# Patient Record
Sex: MALE | Race: WHITE | NOT HISPANIC OR LATINO
[De-identification: names, ages, dates, MRNs, and addresses within clinical notes are randomized per-mention and may not be internally consistent; named-entity substitution may affect disease eponyms.]

---

## 2022-04-08 ENCOUNTER — TRANSCRIPTION ENCOUNTER (OUTPATIENT)
Age: 37
End: 2022-04-08

## 2023-03-03 VITALS
OXYGEN SATURATION: 98 % | TEMPERATURE: 98 F | DIASTOLIC BLOOD PRESSURE: 103 MMHG | HEART RATE: 61 BPM | RESPIRATION RATE: 16 BRPM | SYSTOLIC BLOOD PRESSURE: 164 MMHG

## 2023-03-03 LAB
ALBUMIN SERPL ELPH-MCNC: 4 G/DL — SIGNIFICANT CHANGE UP (ref 3.4–5)
ALP SERPL-CCNC: 47 U/L — SIGNIFICANT CHANGE UP (ref 40–120)
ALT FLD-CCNC: 156 U/L — HIGH (ref 12–42)
ANION GAP SERPL CALC-SCNC: 10 MMOL/L — SIGNIFICANT CHANGE UP (ref 9–16)
AST SERPL-CCNC: 88 U/L — HIGH (ref 15–37)
BASOPHILS # BLD AUTO: 0.03 K/UL — SIGNIFICANT CHANGE UP (ref 0–0.2)
BASOPHILS NFR BLD AUTO: 0.3 % — SIGNIFICANT CHANGE UP (ref 0–2)
BILIRUB DIRECT SERPL-MCNC: 0.2 MG/DL — SIGNIFICANT CHANGE UP (ref 0–0.3)
BILIRUB INDIRECT FLD-MCNC: 0.6 MG/DL — SIGNIFICANT CHANGE UP (ref 0.2–1)
BILIRUB SERPL-MCNC: 0.8 MG/DL — SIGNIFICANT CHANGE UP (ref 0.2–1.2)
BUN SERPL-MCNC: 7 MG/DL — SIGNIFICANT CHANGE UP (ref 7–23)
CALCIUM SERPL-MCNC: 11.3 MG/DL — HIGH (ref 8.5–10.5)
CHLORIDE SERPL-SCNC: 99 MMOL/L — SIGNIFICANT CHANGE UP (ref 96–108)
CO2 SERPL-SCNC: 28 MMOL/L — SIGNIFICANT CHANGE UP (ref 22–31)
CREAT SERPL-MCNC: 0.92 MG/DL — SIGNIFICANT CHANGE UP (ref 0.5–1.3)
EGFR: 110 ML/MIN/1.73M2 — SIGNIFICANT CHANGE UP
EOSINOPHIL # BLD AUTO: 0 K/UL — SIGNIFICANT CHANGE UP (ref 0–0.5)
EOSINOPHIL NFR BLD AUTO: 0 % — SIGNIFICANT CHANGE UP (ref 0–6)
GLUCOSE SERPL-MCNC: 161 MG/DL — HIGH (ref 70–99)
HCT VFR BLD CALC: 51.5 % — HIGH (ref 39–50)
HGB BLD-MCNC: 17.5 G/DL — HIGH (ref 13–17)
IMM GRANULOCYTES NFR BLD AUTO: 0.4 % — SIGNIFICANT CHANGE UP (ref 0–0.9)
LACTATE SERPL-SCNC: 2.1 MMOL/L — HIGH (ref 0.4–2)
LIDOCAIN IGE QN: >1500 U/L — HIGH (ref 73–393)
LYMPHOCYTES # BLD AUTO: 0.88 K/UL — LOW (ref 1–3.3)
LYMPHOCYTES # BLD AUTO: 7.9 % — LOW (ref 13–44)
MCHC RBC-ENTMCNC: 31 PG — SIGNIFICANT CHANGE UP (ref 27–34)
MCHC RBC-ENTMCNC: 34 GM/DL — SIGNIFICANT CHANGE UP (ref 32–36)
MCV RBC AUTO: 91.2 FL — SIGNIFICANT CHANGE UP (ref 80–100)
MONOCYTES # BLD AUTO: 0.86 K/UL — SIGNIFICANT CHANGE UP (ref 0–0.9)
MONOCYTES NFR BLD AUTO: 7.7 % — SIGNIFICANT CHANGE UP (ref 2–14)
NEUTROPHILS # BLD AUTO: 9.33 K/UL — HIGH (ref 1.8–7.4)
NEUTROPHILS NFR BLD AUTO: 83.7 % — HIGH (ref 43–77)
NRBC # BLD: 0 /100 WBCS — SIGNIFICANT CHANGE UP (ref 0–0)
PLATELET # BLD AUTO: 277 K/UL — SIGNIFICANT CHANGE UP (ref 150–400)
POTASSIUM SERPL-MCNC: 4 MMOL/L — SIGNIFICANT CHANGE UP (ref 3.5–5.3)
POTASSIUM SERPL-SCNC: 4 MMOL/L — SIGNIFICANT CHANGE UP (ref 3.5–5.3)
PROT SERPL-MCNC: 7.8 G/DL — SIGNIFICANT CHANGE UP (ref 6.4–8.2)
RBC # BLD: 5.65 M/UL — SIGNIFICANT CHANGE UP (ref 4.2–5.8)
RBC # FLD: 14.3 % — SIGNIFICANT CHANGE UP (ref 10.3–14.5)
SARS-COV-2 RNA SPEC QL NAA+PROBE: SIGNIFICANT CHANGE UP
SODIUM SERPL-SCNC: 137 MMOL/L — SIGNIFICANT CHANGE UP (ref 132–145)
WBC # BLD: 11.15 K/UL — HIGH (ref 3.8–10.5)
WBC # FLD AUTO: 11.15 K/UL — HIGH (ref 3.8–10.5)

## 2023-03-03 PROCEDURE — 99285 EMERGENCY DEPT VISIT HI MDM: CPT

## 2023-03-03 PROCEDURE — 74177 CT ABD & PELVIS W/CONTRAST: CPT | Mod: 26

## 2023-03-03 RX ORDER — SODIUM CHLORIDE 9 MG/ML
1000 INJECTION INTRAMUSCULAR; INTRAVENOUS; SUBCUTANEOUS ONCE
Refills: 0 | Status: COMPLETED | OUTPATIENT
Start: 2023-03-03 | End: 2023-03-03

## 2023-03-03 RX ORDER — KETOROLAC TROMETHAMINE 30 MG/ML
15 SYRINGE (ML) INJECTION ONCE
Refills: 0 | Status: DISCONTINUED | OUTPATIENT
Start: 2023-03-03 | End: 2023-03-03

## 2023-03-03 RX ORDER — FAMOTIDINE 10 MG/ML
20 INJECTION INTRAVENOUS ONCE
Refills: 0 | Status: COMPLETED | OUTPATIENT
Start: 2023-03-03 | End: 2023-03-03

## 2023-03-03 RX ORDER — MORPHINE SULFATE 50 MG/1
4 CAPSULE, EXTENDED RELEASE ORAL ONCE
Refills: 0 | Status: DISCONTINUED | OUTPATIENT
Start: 2023-03-03 | End: 2023-03-03

## 2023-03-03 RX ADMIN — MORPHINE SULFATE 4 MILLIGRAM(S): 50 CAPSULE, EXTENDED RELEASE ORAL at 22:34

## 2023-03-03 RX ADMIN — SODIUM CHLORIDE 1000 MILLILITER(S): 9 INJECTION INTRAMUSCULAR; INTRAVENOUS; SUBCUTANEOUS at 22:23

## 2023-03-03 RX ADMIN — FAMOTIDINE 20 MILLIGRAM(S): 10 INJECTION INTRAVENOUS at 22:21

## 2023-03-03 NOTE — ED PROVIDER NOTE - CLINICAL SUMMARY MEDICAL DECISION MAKING FREE TEXT BOX
A/P: 37-year old M presenting to the E.R. with abdominal pain, epigastric  --Differential includes but not limited to pancreatitis, PUD, gastritis, biliary pathology, appendicitis  --Plan to treat with pepcid IV, IVF  --Will check labs as well as CT A/P A/P: 37-year old M presenting to the E.R. with abdominal pain, epigastric  --Differential includes but not limited to pancreatitis, PUD, gastritis, biliary pathology, appendicitis  --Plan to treat with pepcid IV, IVF  --Will check labs as well as CT A/P    -----  100  --Lipase resulted as 5,895.  CT shows no evidence of pseudocyst or abscess, confirms edematous pancreas.  Patient somewhat relieved with Toradol, but still with some pain.  Vitals stable.  Patient will require admission for IVF, bowel rest, GI consultation as warranted by inpatient team.      Discussed case with  __ A/P: 37-year old M presenting to the E.R. with abdominal pain, epigastric  --Differential includes but not limited to pancreatitis, PUD, gastritis, biliary pathology, appendicitis  --Plan to treat with pepcid IV, IVF  --Will check labs as well as CT A/P    -----  100  --Lipase resulted as 5,895.  CT shows no evidence of pseudocyst or abscess, confirms edematous pancreas.  Patient somewhat relieved with Toradol, but still with some pain.  Vitals stable.  Patient will require admission for IVF, bowel rest, GI consultation as warranted by inpatient team.      Discussed case with Dr. Fracisco Casas who accepts patient for admission.

## 2023-03-03 NOTE — ED PROVIDER NOTE - PHYSICAL EXAMINATION
Constitutional:  Uncomfortable, sweating  HEENT: Airway patent, Nasal mucosa clear. Mouth with normal mucosa.   Eyes: Clear bilaterally, pupils equal, round and reactive to light.  Cardiac: Normal rate, regular rhythm.  Respiratory: Breath sounds clear and equal bilaterally.  GI: Abdomen soft, tender in epigastrium and RUQ  MSK: Spine appears normal, range of motion is not limited, no muscle or joint tenderness  Neuro: Alert and oriented, no focal deficits, no motor or sensory deficits.  Skin: Sweating

## 2023-03-03 NOTE — ED PROVIDER NOTE - OBJECTIVE STATEMENT
37-year-old male with no reported past medical history presenting to the emergency room with abdominal pain.  Patient states that it started at 11 in the morning, states it has become increasingly more severe, states that it started in his epigastrium and started radiating throughout his entire abdomen.  States it is associated with 5 episodes of vomiting.  States he had a normal bowel movement this morning.  States that he feels very hot and very cold, states that he is profusely sweating.  Admits that he drinks alcohol heavily 5 days a week approximately 4-5 drinks.  Denies any abdominal surgeries, states that he had a surgery on his hip years ago.

## 2023-03-03 NOTE — ED ADULT TRIAGE NOTE - CHIEF COMPLAINT QUOTE
Pt walk in c/o severe mid abdominal pain since 11am today. Pt also endorses nausea and vomiting w/ increased blenching and constipation. Pt denies PMH. Pt walk in c/o severe mid abdominal pain since 11am today. Pt also endorses nausea and vomiting w/ increased belching. States last bowel movement was 9am this morning. Pt denies PMH.

## 2023-03-04 ENCOUNTER — INPATIENT (INPATIENT)
Facility: HOSPITAL | Age: 38
LOS: 1 days | Discharge: HOME | DRG: 439 | End: 2023-03-06
Attending: STUDENT IN AN ORGANIZED HEALTH CARE EDUCATION/TRAINING PROGRAM | Admitting: STUDENT IN AN ORGANIZED HEALTH CARE EDUCATION/TRAINING PROGRAM
Payer: COMMERCIAL

## 2023-03-04 DIAGNOSIS — Z98.890 OTHER SPECIFIED POSTPROCEDURAL STATES: Chronic | ICD-10-CM

## 2023-03-04 DIAGNOSIS — K85.90 ACUTE PANCREATITIS WITHOUT NECROSIS OR INFECTION, UNSPECIFIED: ICD-10-CM

## 2023-03-04 DIAGNOSIS — R74.01 ELEVATION OF LEVELS OF LIVER TRANSAMINASE LEVELS: ICD-10-CM

## 2023-03-04 DIAGNOSIS — F10.20 ALCOHOL DEPENDENCE, UNCOMPLICATED: ICD-10-CM

## 2023-03-04 DIAGNOSIS — Z29.9 ENCOUNTER FOR PROPHYLACTIC MEASURES, UNSPECIFIED: ICD-10-CM

## 2023-03-04 LAB
ALBUMIN SERPL ELPH-MCNC: 3.1 G/DL — LOW (ref 3.3–5)
ALP SERPL-CCNC: 34 U/L — LOW (ref 40–120)
ALT FLD-CCNC: 71 U/L — HIGH (ref 10–45)
AMPHET UR-MCNC: NEGATIVE — SIGNIFICANT CHANGE UP
ANION GAP SERPL CALC-SCNC: 10 MMOL/L — SIGNIFICANT CHANGE UP (ref 5–17)
ANION GAP SERPL CALC-SCNC: 12 MMOL/L — SIGNIFICANT CHANGE UP (ref 5–17)
AST SERPL-CCNC: 48 U/L — HIGH (ref 10–40)
BARBITURATES UR SCN-MCNC: NEGATIVE — SIGNIFICANT CHANGE UP
BASOPHILS # BLD AUTO: 0.02 K/UL — SIGNIFICANT CHANGE UP (ref 0–0.2)
BASOPHILS NFR BLD AUTO: 0.2 % — SIGNIFICANT CHANGE UP (ref 0–2)
BENZODIAZ UR-MCNC: NEGATIVE — SIGNIFICANT CHANGE UP
BILIRUB SERPL-MCNC: 0.7 MG/DL — SIGNIFICANT CHANGE UP (ref 0.2–1.2)
BUN SERPL-MCNC: 10 MG/DL — SIGNIFICANT CHANGE UP (ref 7–23)
BUN SERPL-MCNC: 13 MG/DL — SIGNIFICANT CHANGE UP (ref 7–23)
CALCIUM SERPL-MCNC: 8.9 MG/DL — SIGNIFICANT CHANGE UP (ref 8.4–10.5)
CALCIUM SERPL-MCNC: 9.2 MG/DL — SIGNIFICANT CHANGE UP (ref 8.4–10.5)
CHLORIDE SERPL-SCNC: 100 MMOL/L — SIGNIFICANT CHANGE UP (ref 96–108)
CHLORIDE SERPL-SCNC: 98 MMOL/L — SIGNIFICANT CHANGE UP (ref 96–108)
CO2 SERPL-SCNC: 21 MMOL/L — LOW (ref 22–31)
CO2 SERPL-SCNC: 28 MMOL/L — SIGNIFICANT CHANGE UP (ref 22–31)
COCAINE METAB.OTHER UR-MCNC: NEGATIVE — SIGNIFICANT CHANGE UP
CREAT ?TM UR-MCNC: 529 MG/DL — SIGNIFICANT CHANGE UP
CREAT SERPL-MCNC: 0.72 MG/DL — SIGNIFICANT CHANGE UP (ref 0.5–1.3)
CREAT SERPL-MCNC: 0.92 MG/DL — SIGNIFICANT CHANGE UP (ref 0.5–1.3)
EGFR: 110 ML/MIN/1.73M2 — SIGNIFICANT CHANGE UP
EGFR: 121 ML/MIN/1.73M2 — SIGNIFICANT CHANGE UP
EOSINOPHIL # BLD AUTO: 0 K/UL — SIGNIFICANT CHANGE UP (ref 0–0.5)
EOSINOPHIL NFR BLD AUTO: 0 % — SIGNIFICANT CHANGE UP (ref 0–6)
GLUCOSE SERPL-MCNC: 89 MG/DL — SIGNIFICANT CHANGE UP (ref 70–99)
GLUCOSE SERPL-MCNC: 98 MG/DL — SIGNIFICANT CHANGE UP (ref 70–99)
HAV IGM SER-ACNC: SIGNIFICANT CHANGE UP
HBV CORE IGM SER-ACNC: SIGNIFICANT CHANGE UP
HCT VFR BLD CALC: 43.5 % — SIGNIFICANT CHANGE UP (ref 39–50)
HCT VFR BLD CALC: 47 % — SIGNIFICANT CHANGE UP (ref 39–50)
HCV AB S/CO SERPL IA: 0.04 S/CO — SIGNIFICANT CHANGE UP
HCV AB SERPL-IMP: SIGNIFICANT CHANGE UP
HGB BLD-MCNC: 14 G/DL — SIGNIFICANT CHANGE UP (ref 13–17)
HGB BLD-MCNC: 15.4 G/DL — SIGNIFICANT CHANGE UP (ref 13–17)
IMM GRANULOCYTES NFR BLD AUTO: 0.4 % — SIGNIFICANT CHANGE UP (ref 0–0.9)
LACTATE SERPL-SCNC: 1.1 MMOL/L — SIGNIFICANT CHANGE UP (ref 0.4–2)
LYMPHOCYTES # BLD AUTO: 1.1 K/UL — SIGNIFICANT CHANGE UP (ref 1–3.3)
LYMPHOCYTES # BLD AUTO: 8.6 % — LOW (ref 13–44)
MAGNESIUM SERPL-MCNC: 1 MG/DL — CRITICAL LOW (ref 1.6–2.6)
MAGNESIUM SERPL-MCNC: 2 MG/DL — SIGNIFICANT CHANGE UP (ref 1.6–2.6)
MCHC RBC-ENTMCNC: 30.4 PG — SIGNIFICANT CHANGE UP (ref 27–34)
MCHC RBC-ENTMCNC: 30.7 PG — SIGNIFICANT CHANGE UP (ref 27–34)
MCHC RBC-ENTMCNC: 32.2 GM/DL — SIGNIFICANT CHANGE UP (ref 32–36)
MCHC RBC-ENTMCNC: 32.8 GM/DL — SIGNIFICANT CHANGE UP (ref 32–36)
MCV RBC AUTO: 92.9 FL — SIGNIFICANT CHANGE UP (ref 80–100)
MCV RBC AUTO: 95.4 FL — SIGNIFICANT CHANGE UP (ref 80–100)
METHADONE UR-MCNC: NEGATIVE — SIGNIFICANT CHANGE UP
MONOCYTES # BLD AUTO: 0.98 K/UL — HIGH (ref 0–0.9)
MONOCYTES NFR BLD AUTO: 7.6 % — SIGNIFICANT CHANGE UP (ref 2–14)
NEUTROPHILS # BLD AUTO: 10.69 K/UL — HIGH (ref 1.8–7.4)
NEUTROPHILS NFR BLD AUTO: 83.2 % — HIGH (ref 43–77)
NRBC # BLD: 0 /100 WBCS — SIGNIFICANT CHANGE UP (ref 0–0)
NRBC # BLD: 0 /100 WBCS — SIGNIFICANT CHANGE UP (ref 0–0)
OPIATES UR-MCNC: POSITIVE
OSMOLALITY UR: 928 MOSM/KG — HIGH (ref 300–900)
PCP SPEC-MCNC: SIGNIFICANT CHANGE UP
PCP UR-MCNC: NEGATIVE — SIGNIFICANT CHANGE UP
PHOSPHATE SERPL-MCNC: 2.8 MG/DL — SIGNIFICANT CHANGE UP (ref 2.5–4.5)
PLATELET # BLD AUTO: 205 K/UL — SIGNIFICANT CHANGE UP (ref 150–400)
PLATELET # BLD AUTO: 212 K/UL — SIGNIFICANT CHANGE UP (ref 150–400)
POTASSIUM SERPL-MCNC: 4.2 MMOL/L — SIGNIFICANT CHANGE UP (ref 3.5–5.3)
POTASSIUM SERPL-MCNC: 4.3 MMOL/L — SIGNIFICANT CHANGE UP (ref 3.5–5.3)
POTASSIUM SERPL-SCNC: 4.2 MMOL/L — SIGNIFICANT CHANGE UP (ref 3.5–5.3)
POTASSIUM SERPL-SCNC: 4.3 MMOL/L — SIGNIFICANT CHANGE UP (ref 3.5–5.3)
PROT SERPL-MCNC: 5.3 G/DL — LOW (ref 6–8.3)
RBC # BLD: 4.56 M/UL — SIGNIFICANT CHANGE UP (ref 4.2–5.8)
RBC # BLD: 5.06 M/UL — SIGNIFICANT CHANGE UP (ref 4.2–5.8)
RBC # FLD: 14.6 % — HIGH (ref 10.3–14.5)
RBC # FLD: 15 % — HIGH (ref 10.3–14.5)
SODIUM SERPL-SCNC: 133 MMOL/L — LOW (ref 135–145)
SODIUM SERPL-SCNC: 136 MMOL/L — SIGNIFICANT CHANGE UP (ref 135–145)
SODIUM UR-SCNC: <20 MMOL/L — SIGNIFICANT CHANGE UP
THC UR QL: NEGATIVE — SIGNIFICANT CHANGE UP
TRIGL SERPL-MCNC: 83 MG/DL — SIGNIFICANT CHANGE UP
WBC # BLD: 12.84 K/UL — HIGH (ref 3.8–10.5)
WBC # BLD: 14.74 K/UL — HIGH (ref 3.8–10.5)
WBC # FLD AUTO: 12.84 K/UL — HIGH (ref 3.8–10.5)
WBC # FLD AUTO: 14.74 K/UL — HIGH (ref 3.8–10.5)

## 2023-03-04 PROCEDURE — 99223 1ST HOSP IP/OBS HIGH 75: CPT

## 2023-03-04 RX ORDER — LANOLIN ALCOHOL/MO/W.PET/CERES
3 CREAM (GRAM) TOPICAL AT BEDTIME
Refills: 0 | Status: DISCONTINUED | OUTPATIENT
Start: 2023-03-04 | End: 2023-03-05

## 2023-03-04 RX ORDER — SODIUM CHLORIDE 9 MG/ML
1000 INJECTION, SOLUTION INTRAVENOUS ONCE
Refills: 0 | Status: COMPLETED | OUTPATIENT
Start: 2023-03-04 | End: 2023-03-04

## 2023-03-04 RX ORDER — MORPHINE SULFATE 50 MG/1
4 CAPSULE, EXTENDED RELEASE ORAL EVERY 6 HOURS
Refills: 0 | Status: DISCONTINUED | OUTPATIENT
Start: 2023-03-04 | End: 2023-03-05

## 2023-03-04 RX ORDER — ONDANSETRON 8 MG/1
4 TABLET, FILM COATED ORAL EVERY 6 HOURS
Refills: 0 | Status: DISCONTINUED | OUTPATIENT
Start: 2023-03-04 | End: 2023-03-06

## 2023-03-04 RX ORDER — ONDANSETRON 8 MG/1
4 TABLET, FILM COATED ORAL ONCE
Refills: 0 | Status: COMPLETED | OUTPATIENT
Start: 2023-03-04 | End: 2023-03-04

## 2023-03-04 RX ORDER — ACETAMINOPHEN 500 MG
650 TABLET ORAL EVERY 6 HOURS
Refills: 0 | Status: DISCONTINUED | OUTPATIENT
Start: 2023-03-04 | End: 2023-03-06

## 2023-03-04 RX ORDER — THIAMINE MONONITRATE (VIT B1) 100 MG
100 TABLET ORAL DAILY
Refills: 0 | Status: DISCONTINUED | OUTPATIENT
Start: 2023-03-04 | End: 2023-03-06

## 2023-03-04 RX ORDER — SODIUM CHLORIDE 9 MG/ML
1000 INJECTION INTRAMUSCULAR; INTRAVENOUS; SUBCUTANEOUS
Refills: 0 | Status: DISCONTINUED | OUTPATIENT
Start: 2023-03-04 | End: 2023-03-04

## 2023-03-04 RX ORDER — OXYCODONE HYDROCHLORIDE 5 MG/1
5 TABLET ORAL EVERY 6 HOURS
Refills: 0 | Status: DISCONTINUED | OUTPATIENT
Start: 2023-03-04 | End: 2023-03-04

## 2023-03-04 RX ORDER — KETOROLAC TROMETHAMINE 30 MG/ML
15 SYRINGE (ML) INJECTION EVERY 6 HOURS
Refills: 0 | Status: DISCONTINUED | OUTPATIENT
Start: 2023-03-04 | End: 2023-03-06

## 2023-03-04 RX ORDER — MAGNESIUM SULFATE 500 MG/ML
2 VIAL (ML) INJECTION ONCE
Refills: 0 | Status: COMPLETED | OUTPATIENT
Start: 2023-03-04 | End: 2023-03-04

## 2023-03-04 RX ORDER — MORPHINE SULFATE 50 MG/1
2 CAPSULE, EXTENDED RELEASE ORAL EVERY 6 HOURS
Refills: 0 | Status: DISCONTINUED | OUTPATIENT
Start: 2023-03-04 | End: 2023-03-05

## 2023-03-04 RX ORDER — MORPHINE SULFATE 50 MG/1
4 CAPSULE, EXTENDED RELEASE ORAL ONCE
Refills: 0 | Status: DISCONTINUED | OUTPATIENT
Start: 2023-03-04 | End: 2023-03-04

## 2023-03-04 RX ORDER — KETOROLAC TROMETHAMINE 30 MG/ML
15 SYRINGE (ML) INJECTION ONCE
Refills: 0 | Status: DISCONTINUED | OUTPATIENT
Start: 2023-03-04 | End: 2023-03-04

## 2023-03-04 RX ORDER — SODIUM CHLORIDE 9 MG/ML
1000 INJECTION, SOLUTION INTRAVENOUS
Refills: 0 | Status: DISCONTINUED | OUTPATIENT
Start: 2023-03-04 | End: 2023-03-05

## 2023-03-04 RX ORDER — ONDANSETRON 8 MG/1
4 TABLET, FILM COATED ORAL EVERY 6 HOURS
Refills: 0 | Status: DISCONTINUED | OUTPATIENT
Start: 2023-03-04 | End: 2023-03-04

## 2023-03-04 RX ORDER — FOLIC ACID 0.8 MG
1 TABLET ORAL DAILY
Refills: 0 | Status: DISCONTINUED | OUTPATIENT
Start: 2023-03-04 | End: 2023-03-06

## 2023-03-04 RX ORDER — ONDANSETRON 8 MG/1
4 TABLET, FILM COATED ORAL ONCE
Refills: 0 | Status: DISCONTINUED | OUTPATIENT
Start: 2023-03-04 | End: 2023-03-04

## 2023-03-04 RX ORDER — INFLUENZA VIRUS VACCINE 15; 15; 15; 15 UG/.5ML; UG/.5ML; UG/.5ML; UG/.5ML
0.5 SUSPENSION INTRAMUSCULAR ONCE
Refills: 0 | Status: DISCONTINUED | OUTPATIENT
Start: 2023-03-04 | End: 2023-03-06

## 2023-03-04 RX ORDER — MAGNESIUM SULFATE 500 MG/ML
4 VIAL (ML) INJECTION ONCE
Refills: 0 | Status: COMPLETED | OUTPATIENT
Start: 2023-03-04 | End: 2023-03-04

## 2023-03-04 RX ORDER — OXYCODONE HYDROCHLORIDE 5 MG/1
10 TABLET ORAL EVERY 6 HOURS
Refills: 0 | Status: DISCONTINUED | OUTPATIENT
Start: 2023-03-04 | End: 2023-03-04

## 2023-03-04 RX ADMIN — MORPHINE SULFATE 4 MILLIGRAM(S): 50 CAPSULE, EXTENDED RELEASE ORAL at 06:39

## 2023-03-04 RX ADMIN — Medication 15 MILLIGRAM(S): at 19:39

## 2023-03-04 RX ADMIN — SODIUM CHLORIDE 200 MILLILITER(S): 9 INJECTION, SOLUTION INTRAVENOUS at 18:30

## 2023-03-04 RX ADMIN — ONDANSETRON 4 MILLIGRAM(S): 8 TABLET, FILM COATED ORAL at 03:13

## 2023-03-04 RX ADMIN — MORPHINE SULFATE 4 MILLIGRAM(S): 50 CAPSULE, EXTENDED RELEASE ORAL at 10:55

## 2023-03-04 RX ADMIN — Medication 15 MILLIGRAM(S): at 13:08

## 2023-03-04 RX ADMIN — SODIUM CHLORIDE 1000 MILLILITER(S): 9 INJECTION INTRAMUSCULAR; INTRAVENOUS; SUBCUTANEOUS at 00:10

## 2023-03-04 RX ADMIN — SODIUM CHLORIDE 200 MILLILITER(S): 9 INJECTION, SOLUTION INTRAVENOUS at 08:04

## 2023-03-04 RX ADMIN — Medication 15 MILLIGRAM(S): at 05:07

## 2023-03-04 RX ADMIN — SODIUM CHLORIDE 1000 MILLILITER(S): 9 INJECTION, SOLUTION INTRAVENOUS at 03:15

## 2023-03-04 RX ADMIN — SODIUM CHLORIDE 200 MILLILITER(S): 9 INJECTION, SOLUTION INTRAVENOUS at 13:08

## 2023-03-04 RX ADMIN — Medication 15 MILLIGRAM(S): at 13:23

## 2023-03-04 RX ADMIN — Medication 15 MILLIGRAM(S): at 00:45

## 2023-03-04 RX ADMIN — MORPHINE SULFATE 4 MILLIGRAM(S): 50 CAPSULE, EXTENDED RELEASE ORAL at 11:10

## 2023-03-04 RX ADMIN — MORPHINE SULFATE 4 MILLIGRAM(S): 50 CAPSULE, EXTENDED RELEASE ORAL at 17:33

## 2023-03-04 RX ADMIN — Medication 25 GRAM(S): at 22:16

## 2023-03-04 RX ADMIN — Medication 15 MILLIGRAM(S): at 00:10

## 2023-03-04 RX ADMIN — ONDANSETRON 4 MILLIGRAM(S): 8 TABLET, FILM COATED ORAL at 11:26

## 2023-03-04 RX ADMIN — MORPHINE SULFATE 4 MILLIGRAM(S): 50 CAPSULE, EXTENDED RELEASE ORAL at 17:18

## 2023-03-04 RX ADMIN — ONDANSETRON 4 MILLIGRAM(S): 8 TABLET, FILM COATED ORAL at 17:43

## 2023-03-04 RX ADMIN — MORPHINE SULFATE 4 MILLIGRAM(S): 50 CAPSULE, EXTENDED RELEASE ORAL at 06:25

## 2023-03-04 RX ADMIN — Medication 25 GRAM(S): at 13:08

## 2023-03-04 RX ADMIN — Medication 15 MILLIGRAM(S): at 19:24

## 2023-03-04 RX ADMIN — MORPHINE SULFATE 4 MILLIGRAM(S): 50 CAPSULE, EXTENDED RELEASE ORAL at 03:13

## 2023-03-04 NOTE — H&P ADULT - PROBLEM SELECTOR PLAN 5
F: LR @200  E: monitor, replete prn (elevated Ca likely i/s/o dehydration)  N: Clear liquid diet, advance as tolerated  DVT ppx: SCDs (improve score 0)    Dispo: RMF

## 2023-03-04 NOTE — H&P ADULT - PROBLEM SELECTOR PLAN 3
Patient in precontemplative stage. CIWA 7 now, likely due to nausea/vomiting, otherwise no evidence of withdrawal. No hx of withdrawal or seizures. Last drink was 5pm last night, no blood alcohol level on admission.   - [facility] protocol  - SBIRT consult  - Thiamine, folate, multivitamin  - F/u Utox

## 2023-03-04 NOTE — H&P ADULT - PROBLEM SELECTOR PLAN 2
, AST 88 on admission. ALT:AST ratio 2:1. Possibly reactive i/s/o pancreatitis. Could be hepatitis vs chronic alcohol use disorder, though ALT greater than AST.   - F/u repeat CMP  - F/u hep panel

## 2023-03-04 NOTE — H&P ADULT - HISTORY OF PRESENT ILLNESS
The patient is a 38 y/o male with no significant PMHx who presents to [facility] with 1 day of progressively worsening abdominal pain and 5 episodes of vomiting. Also admits to fevers, chills, and profuse sweating. Abdominal pain began at around 11am yesterday near the RLQ and worsened throughout the day. It started radiating to the epigastric region and the patient developed nausea in the evening, followed by a few episodes of NBNB vomiting. Denies headaches, chest pain, palpitations, diarrhea, constipation, anxiety, tremors. Did not take any medications for this. Notes he drinks about 4-5 times a week and has about 5 drinks at a time. Last drink was 5pm yesterday, right before the nausea/vomiting began. Last BM was yesterday morning, normal. Denies using other drugs, having abdominal surgeries, experiencing alcohol withdrawal, having seizures, or ever being hospitalized for alcohol use. Last hospitalization was 1 year ago for cellulitis of LE. No recent travel or sick contacts. Does not take any medications on a regular basis.      The patient is a 36 y/o male with no significant PMHx who presents to [facility] with 1 day of progressively worsening abdominal pain and 5 episodes of vomiting. Also admits to fevers, chills, and profuse sweating. Abdominal pain began at around 11am yesterday near the RLQ and worsened throughout the day. It started radiating to the epigastric region and the patient developed nausea in the evening, followed by a few episodes of NBNB vomiting. Denies headaches, chest pain, palpitations, diarrhea, constipation, anxiety, tremors. Did not take any medications for this. Notes he drinks about 4-5 times a week and has about 5 drinks at a time. Last drink was 5pm yesterday, right before the nausea/vomiting began. Last BM was yesterday morning, normal. Denies using other drugs, having abdominal surgeries, experiencing alcohol withdrawal, having seizures, or ever being hospitalized for alcohol use. Last hospitalization was 1 year ago for cellulitis of LE. No recent travel or sick contacts. Does not take any medications on a regular basis.    The patient is a 38 y/o male with no significant PMHx who presents to [facility] with 1 day of progressively worsening abdominal pain and 5 episodes of vomiting. Also admits to fevers, chills, and profuse sweating. Abdominal pain began at around 11am yesterday near the RLQ and worsened throughout the day. It started radiating to the epigastric region and the patient developed nausea in the evening, followed by a few episodes of NBNB vomiting. Denies headaches, chest pain, palpitations, diarrhea, constipation, anxiety, tremors. Did not take any medications for this. Notes he drinks about 4-5 times a week and has about 5 drinks at a time. Last drink was 5pm yesterday, right before the nausea/vomiting began. Last BM was yesterday morning, normal. Denies using other drugs, having abdominal surgeries, experiencing alcohol withdrawal, having seizures, or ever being hospitalized for alcohol use. Last hospitalization was 1 year ago for cellulitis of LE. No recent travel or sick contacts. Does not take any medications on a regular basis.     ED Course:  Vitals: 97.5 F, HR 61, /103, RR 16, 98% on RA  Labs: WBC 11.15, Neutrophils 83.7%, Hgb 17.5, Hct 51.5, Glu 161, Ca 11.3, AST 88, , Lipase >1500  CT a/p: The pancreas is diffusely enlarged and edematous with severe surrounding peripancreatic fat stranding and fluid, consistent with interstitial pancreatitis. No evidence of pancreatic necrosis or peripancreatic fluid collection. The splenic and portal veins are patent. There is associated secondary inflammation of the adjacent stomach and duodenum.  EKG: sinus bradycardia at 51bpm  Consults: None  Interventions: 2L NS, 1L LR, Morphine IV 4mg x3, Zofran 4mg x1, Ketorolac 15mg x2

## 2023-03-04 NOTE — H&P ADULT - ASSESSMENT
36 y/o male with no significant PMHx who presents to [facility] with 1 day of progressively worsening abdominal pain and 5 episodes of vomiting with fevers, chills, and profuse sweating. Found to have enlarged and severely edematous pancreas with peripancreatic fat stranding and fluid. Admitted for acute interstitial pancreatitis.

## 2023-03-04 NOTE — ED ADULT NURSE NOTE - CHIEF COMPLAINT QUOTE
Pt walk in c/o severe mid abdominal pain since 11am today. Pt also endorses nausea and vomiting w/ increased belching. States last bowel movement was 9am this morning. Pt denies PMH.

## 2023-03-04 NOTE — ED ADULT NURSE NOTE - OBJECTIVE STATEMENT
36 y/o M here with severe mid abdominal pain since 11am today. Pt also endorses nausea and vomiting w/ increased belching. States last bowel movement was 9am this morning. Pt denies PMH.

## 2023-03-04 NOTE — H&P ADULT - PROBLEM SELECTOR PLAN 1
Fevers, chills, nausea, vomiting, and epigastric abdominal pain radiating to RLQ x1 day. Afebrile, mild leukocytosis and elevated lactate on admission. Meets 1/4 SIRS criteria. Transaminitis and >1500 lipase. CT with enlarged and severely edematous pancreas with peripancreatic fat stranding and fluid, consistent with acute interstitial pancreatitis. No evidence of abscess or complications at this time.   - Start LR 200cc/hr  - Zofran 4mg IV prn q6h for nausea  - Pain control: IV Morphine 2mg for moderate pain and 4mg for severe pain q4h prn  - Clear liquid diet; advance as tolerated Fevers, chills, nausea, vomiting, and epigastric abdominal pain radiating to RLQ x1 day. Afebrile, mild leukocytosis and elevated lactate on admission. Meets 1/4 SIRS criteria. Transaminitis and >1500 lipase. CT with enlarged and severely edematous pancreas with peripancreatic fat stranding and fluid, consistent with acute interstitial pancreatitis. No evidence of abscess or complications at this time.   - Start LR 200cc/hr  - Zofran 4mg IV prn q6h for nausea  - Pain control: IV Morphine 2mg for moderate pain and 4mg for severe pain q4h prn  - Ketorolac 15mg IV PRN q6h with breakthrough pain  - Clear liquid diet; advance as tolerated Fevers, chills, nausea, vomiting, and epigastric abdominal pain radiating to RLQ x1 day. Afebrile, mild leukocytosis and elevated lactate on admission. Meets 1/4 SIRS criteria. Transaminitis and >1500 lipase. CT with enlarged and severely edematous pancreas with peripancreatic fat stranding and fluid, consistent with acute interstitial pancreatitis. No evidence of abscess or complications at this time. Etiology likely alcohol use. Does not take medications, no hx of abdominal surgery, no concern for gallstones.   - Start LR 200cc/hr  - Zofran 4mg IV prn q6h for nausea  - Pain control: IV Morphine 2mg for moderate pain and 4mg for severe pain q4h prn  - Ketorolac 15mg IV PRN q6h with breakthrough pain  - Clear liquid diet; advance as tolerated  - F/u triglyceride levels

## 2023-03-04 NOTE — PATIENT PROFILE ADULT - FALL HARM RISK - HARM RISK INTERVENTIONS
Assistance with ambulation/Assistance OOB with selected safe patient handling equipment/Communicate Risk of Fall with Harm to all staff/Monitor for mental status changes/Monitor gait and stability/Reinforce activity limits and safety measures with patient and family/Review medications for side effects contributing to fall risk/Tailored Fall Risk Interventions/Use of alarms - bed, chair and/or voice tab/Bed in lowest position, wheels locked, appropriate side rails in place/Call bell, personal items and telephone in reach/Instruct patient to call for assistance before getting out of bed or chair/Non-slip footwear when patient is out of bed/[place] to call system/Physically safe environment - no spills, clutter or unnecessary equipment/Purposeful Proactive Rounding/Room/bathroom lighting operational, light cord in reach

## 2023-03-04 NOTE — H&P ADULT - NSHPPHYSICALEXAM_GEN_ALL_CORE
VITAL SIGNS:  Vital Signs Last 24 Hrs  T(C): 36.4 (04 Mar 2023 06:09), Max: 37.1 (03 Mar 2023 22:14)  T(F): 97.6 (04 Mar 2023 06:09), Max: 98.8 (04 Mar 2023 03:51)  HR: 73 (04 Mar 2023 06:09) (52 - 73)  BP: 160/99 (04 Mar 2023 06:09) (152/92 - 164/103)  RR: 18 (04 Mar 2023 06:09) (16 - 18)  SpO2: 96% (04 Mar 2023 06:09) (96% - 100%)    Parameters below as of 04 Mar 2023 06:09  Patient On (Oxygen Delivery Method): room air      PHYSICAL EXAM:  Constitutional: young male resting comfortably, in mild distress, not diaphoretic   HEENT: NC/AT, PERRL, anicteric sclera, dry MM  Neck: supple  Respiratory: CTA B/L; no W/R/R, no retractions  Cardiac: +S1/S2; RRR; no M/R/G  Gastrointestinal: soft, ND, +diffuse TTP worse in the epigastric and RLQ regions, +guarding, +BSx4  Extremities: WWP, no clubbing or cyanosis; no peripheral edema  Musculoskeletal: NROM x4; no joint swelling, tenderness or erythema  Vascular: 2+ radial pulses B/L  Dermatologic: skin warm, dry and intact; no rashes, wounds, or scars  Neurologic: AAOx3, no focal deficits, no tremor present on outstretched hands  Psychiatric: calm, cooperative, behaviors are appropriate - - - VITAL SIGNS:  Vital Signs Last 24 Hrs  T(C): 36.4 (04 Mar 2023 06:09), Max: 37.1 (03 Mar 2023 22:14)  T(F): 97.6 (04 Mar 2023 06:09), Max: 98.8 (04 Mar 2023 03:51)  HR: 73 (04 Mar 2023 06:09) (52 - 73)  BP: 160/99 (04 Mar 2023 06:09) (152/92 - 164/103)  RR: 18 (04 Mar 2023 06:09) (16 - 18)  SpO2: 96% (04 Mar 2023 06:09) (96% - 100%)    Parameters below as of 04 Mar 2023 06:09  Patient On (Oxygen Delivery Method): room air      PHYSICAL EXAM:  Constitutional: young male resting comfortably, in mild distress, not diaphoretic   HEENT: NC/AT, PERRL, anicteric sclera, dry MM  Neck: supple  Respiratory: CTA B/L; no W/R/R, no retractions  Cardiac: +S1/S2; RRR; no M/R/G  Gastrointestinal: soft, ND, +diffuse TTP worse in the epigastric and RLQ regions, +guarding, +BSx4, no hepatomegaly, no caput medusa, no ascites  Extremities: WWP, no clubbing or cyanosis; no peripheral edema  Musculoskeletal: NROM x4; no joint swelling, tenderness or erythema  Vascular: 2+ radial pulses B/L  Dermatologic: skin warm, dry and intact; no rashes, wounds, or scars, no jaundice  Neurologic: AAOx3, no focal deficits, no tremor present on outstretched hands  Psychiatric: calm, cooperative, behaviors are appropriate

## 2023-03-04 NOTE — H&P ADULT - PROBLEM SELECTOR PLAN 4
F: LR @200  E: monitor, replete prn (elevated Ca likely i/s/o dehydration)  N: Clear liquid diet, advance as tolerated  DVT ppx: SCDs (improve score 0)    Dispo: RMF 2/2 pancreatitis; RESOLVED

## 2023-03-04 NOTE — H&P ADULT - ATTENDING COMMENTS
36 y/o male with no significant PMHx who presents to [facility] with 1 day of progressively worsening abdominal pain and 5 episodes of vomiting with fevers, chills, and profuse sweating. Found to have enlarged and severely edematous pancreas with peripancreatic fat stranding and fluid. Admitted for acute interstitial pancreatitis.     Plan  -c/w IVF, morphine/toradol for pain, antiemetic  -advance diet cautiously as tolerated  -f/u utox, trend lytes  -if pain worsens/persists, will obtain repeat ab imaging  -if fevers develop, will obtain bcx

## 2023-03-05 ENCOUNTER — TRANSCRIPTION ENCOUNTER (OUTPATIENT)
Age: 38
End: 2023-03-05

## 2023-03-05 LAB
ALBUMIN SERPL ELPH-MCNC: 3.1 G/DL — LOW (ref 3.3–5)
ALP SERPL-CCNC: 36 U/L — LOW (ref 40–120)
ALT FLD-CCNC: 59 U/L — HIGH (ref 10–45)
ANION GAP SERPL CALC-SCNC: 9 MMOL/L — SIGNIFICANT CHANGE UP (ref 5–17)
AST SERPL-CCNC: 45 U/L — HIGH (ref 10–40)
BASOPHILS # BLD AUTO: 0.02 K/UL — SIGNIFICANT CHANGE UP (ref 0–0.2)
BASOPHILS NFR BLD AUTO: 0.1 % — SIGNIFICANT CHANGE UP (ref 0–2)
BILIRUB SERPL-MCNC: 0.9 MG/DL — SIGNIFICANT CHANGE UP (ref 0.2–1.2)
BUN SERPL-MCNC: 13 MG/DL — SIGNIFICANT CHANGE UP (ref 7–23)
CALCIUM SERPL-MCNC: 8.1 MG/DL — LOW (ref 8.4–10.5)
CHLORIDE SERPL-SCNC: 96 MMOL/L — SIGNIFICANT CHANGE UP (ref 96–108)
CO2 SERPL-SCNC: 27 MMOL/L — SIGNIFICANT CHANGE UP (ref 22–31)
CREAT SERPL-MCNC: 0.94 MG/DL — SIGNIFICANT CHANGE UP (ref 0.5–1.3)
EGFR: 107 ML/MIN/1.73M2 — SIGNIFICANT CHANGE UP
EOSINOPHIL # BLD AUTO: 0.01 K/UL — SIGNIFICANT CHANGE UP (ref 0–0.5)
EOSINOPHIL NFR BLD AUTO: 0.1 % — SIGNIFICANT CHANGE UP (ref 0–6)
GLUCOSE SERPL-MCNC: 92 MG/DL — SIGNIFICANT CHANGE UP (ref 70–99)
HBV SURFACE AG SER-ACNC: SIGNIFICANT CHANGE UP
HCT VFR BLD CALC: 43.3 % — SIGNIFICANT CHANGE UP (ref 39–50)
HGB BLD-MCNC: 14.1 G/DL — SIGNIFICANT CHANGE UP (ref 13–17)
IMM GRANULOCYTES NFR BLD AUTO: 0.9 % — SIGNIFICANT CHANGE UP (ref 0–0.9)
LYMPHOCYTES # BLD AUTO: 1.48 K/UL — SIGNIFICANT CHANGE UP (ref 1–3.3)
LYMPHOCYTES # BLD AUTO: 10.9 % — LOW (ref 13–44)
MAGNESIUM SERPL-MCNC: 2.2 MG/DL — SIGNIFICANT CHANGE UP (ref 1.6–2.6)
MCHC RBC-ENTMCNC: 30.7 PG — SIGNIFICANT CHANGE UP (ref 27–34)
MCHC RBC-ENTMCNC: 32.6 GM/DL — SIGNIFICANT CHANGE UP (ref 32–36)
MCV RBC AUTO: 94.1 FL — SIGNIFICANT CHANGE UP (ref 80–100)
MONOCYTES # BLD AUTO: 0.97 K/UL — HIGH (ref 0–0.9)
MONOCYTES NFR BLD AUTO: 7.1 % — SIGNIFICANT CHANGE UP (ref 2–14)
NEUTROPHILS # BLD AUTO: 11 K/UL — HIGH (ref 1.8–7.4)
NEUTROPHILS NFR BLD AUTO: 80.9 % — HIGH (ref 43–77)
NRBC # BLD: 0 /100 WBCS — SIGNIFICANT CHANGE UP (ref 0–0)
PHOSPHATE SERPL-MCNC: 1.7 MG/DL — LOW (ref 2.5–4.5)
PLATELET # BLD AUTO: 196 K/UL — SIGNIFICANT CHANGE UP (ref 150–400)
POTASSIUM SERPL-MCNC: 4.1 MMOL/L — SIGNIFICANT CHANGE UP (ref 3.5–5.3)
POTASSIUM SERPL-SCNC: 4.1 MMOL/L — SIGNIFICANT CHANGE UP (ref 3.5–5.3)
PROT SERPL-MCNC: 5.5 G/DL — LOW (ref 6–8.3)
RBC # BLD: 4.6 M/UL — SIGNIFICANT CHANGE UP (ref 4.2–5.8)
RBC # FLD: 14.8 % — HIGH (ref 10.3–14.5)
SODIUM SERPL-SCNC: 132 MMOL/L — LOW (ref 135–145)
WBC # BLD: 13.6 K/UL — HIGH (ref 3.8–10.5)
WBC # FLD AUTO: 13.6 K/UL — HIGH (ref 3.8–10.5)

## 2023-03-05 PROCEDURE — 99233 SBSQ HOSP IP/OBS HIGH 50: CPT | Mod: GC

## 2023-03-05 PROCEDURE — 74177 CT ABD & PELVIS W/CONTRAST: CPT | Mod: 26

## 2023-03-05 RX ORDER — POLYETHYLENE GLYCOL 3350 17 G/17G
17 POWDER, FOR SOLUTION ORAL DAILY
Refills: 0 | Status: DISCONTINUED | OUTPATIENT
Start: 2023-03-05 | End: 2023-03-06

## 2023-03-05 RX ORDER — SIMETHICONE 80 MG/1
80 TABLET, CHEWABLE ORAL EVERY 24 HOURS
Refills: 0 | Status: DISCONTINUED | OUTPATIENT
Start: 2023-03-05 | End: 2023-03-06

## 2023-03-05 RX ORDER — SENNA PLUS 8.6 MG/1
1 TABLET ORAL DAILY
Refills: 0 | Status: DISCONTINUED | OUTPATIENT
Start: 2023-03-05 | End: 2023-03-06

## 2023-03-05 RX ORDER — LANOLIN ALCOHOL/MO/W.PET/CERES
5 CREAM (GRAM) TOPICAL AT BEDTIME
Refills: 0 | Status: DISCONTINUED | OUTPATIENT
Start: 2023-03-05 | End: 2023-03-06

## 2023-03-05 RX ADMIN — Medication 1 MILLIGRAM(S): at 11:31

## 2023-03-05 RX ADMIN — SIMETHICONE 80 MILLIGRAM(S): 80 TABLET, CHEWABLE ORAL at 13:05

## 2023-03-05 RX ADMIN — SENNA PLUS 1 TABLET(S): 8.6 TABLET ORAL at 13:05

## 2023-03-05 RX ADMIN — Medication 100 MILLIGRAM(S): at 11:31

## 2023-03-05 RX ADMIN — Medication 1 TABLET(S): at 11:31

## 2023-03-05 RX ADMIN — Medication 3 MILLIGRAM(S): at 22:26

## 2023-03-05 RX ADMIN — POLYETHYLENE GLYCOL 3350 17 GRAM(S): 17 POWDER, FOR SOLUTION ORAL at 13:05

## 2023-03-05 RX ADMIN — Medication 85 MILLIMOLE(S): at 11:30

## 2023-03-05 NOTE — DISCHARGE NOTE PROVIDER - NSDCCPTREATMENT_GEN_ALL_CORE_FT
PRINCIPAL PROCEDURE  Procedure: CT abdomen pelvis  Findings and Treatment: PROCEDURE DATE:  03/03/2023    FINDINGS:  LOWER CHEST: Bilateral lower lobe dependent atelectasis.  LIVER: Geographic ill-defined areas of hypodensity, most likely   representing focal fatty deposition. No suspicious lesion. Hepatomegaly.  BILE DUCTS: Normal caliber.  GALLBLADDER: Within normal limits.  SPLEEN: Within normal limits.  PANCREAS: The pancreas is diffusely enlarged and edematous with severe   surrounding peripancreatic fat stranding and fluid, consistent with   interstitial pancreatitis. No evidence of pancreatic necrosis or   peripancreatic fluid collection. The splenic and portal veins are patent.   There is associated secondary inflammation of the adjacent stomach and   duodenum.  ADRENALS: Within normal limits.  KIDNEYS/URETERS: No renal stones or hydronephrosis.  BLADDER: Minimally distended.  REPRODUCTIVE ORGANS: Prostate within normal limits.  BOWEL: Inflammation of the stomach and duodenum secondary to adjacent   pancreatitis, as above. No bowel obstruction.  PERITONEUM: No ascites.  VESSELS: Within normal limits.  RETROPERITONEUM/LYMPH NODES: No lymphadenopathy.  ABDOMINAL WALL: Small bilateral fat-containing inguinal hernias.  BONES: Within normal limits.  IMPRESSION:  Interstitial edematous pancreatitis. No evidence of pancreatic necrosis   or peripancreaticfluid collection identified.        SECONDARY PROCEDURE  Procedure: CT abdomen  Findings and Treatment: PROCEDURE DATE:  03/05/2023    FINDINGS:  LOWER CHEST: Small effusions and atelectasis.  LIVER: Within normal limits.  BILE DUCTS: Normal caliber.  GALLBLADDER: Within normal limits.  SPLEEN: Within normal limits.  PANCREAS: Decreased enhancement of the neck? Evolving necrosis.   Redemonstrated pancreatic and peripancreatic edema. No hemorrhage.  ADRENALS: Within normal limits.  KIDNEYS/URETERS: Within normal limits.  BLADDER: Within normal limits.  REPRODUCTIVE ORGANS: Prostate within normal limits.  BOWEL: No bowel obstruction. Appendix is normal.  PERITONEUM: Small right abdominal and pelvic ascites.  VESSELS: Narrowing of the splenic vein, no thrombus. No arterial   pseudoaneurysm.  RETROPERITONEUM/LYMPH NODES: No lymphadenopathy.  ABDOMINAL WALL: Within normal limits.  BONES: Within normal limits.  IMPRESSION:  Question evolving necrosis of the pancreatic neck. No additional acute   findings. No bowel obstruction.

## 2023-03-05 NOTE — PROGRESS NOTE ADULT - PROBLEM SELECTOR PLAN 3
Patient in precontemplative stage. CIWA 7 now, likely due to nausea/vomiting, otherwise no evidence of withdrawal. No hx of withdrawal or seizures. Last drink was 5pm last night, no blood alcohol level on admission.   - [facility] protocol  - SBIRT consult  - Thiamine, folate, multivitamin  - F/u Utox Patient in precontemplative stage. CIWA 7 now, likely due to nausea/vomiting, otherwise no evidence of withdrawal. No hx of withdrawal or seizures. Last drink was 5pm last night, no blood alcohol level on admission. UTox positive for opiates, however received pain meds in ED.  - CIWA protocol; CIWA 0 today, no PRN ativan needed  - SBIRT consult  - Thiamine, folate, multivitamin

## 2023-03-05 NOTE — PROGRESS NOTE ADULT - ATTENDING COMMENTS
36 y/o male with no significant PMHx who presents to [facility] with 1 day of progressively worsening abdominal pain and 5 episodes of vomiting with fevers, chills, and profuse sweating. Found to have enlarged and severely edematous pancreas with peripancreatic fat stranding and fluid. Admitted for acute interstitial pancreatitis.     Plan  -fevers/chills, significant ab pain resolved; abdominal distention and suprapubic discomfort present; will obtain repeat abdominal imaging  -diet advanced, c/w monitoring  -LFTs downtrending

## 2023-03-05 NOTE — PROGRESS NOTE ADULT - PROBLEM SELECTOR PLAN 1
Fevers, chills, nausea, vomiting, and epigastric abdominal pain radiating to RLQ x1 day. Afebrile, mild leukocytosis and elevated lactate on admission. Meets 1/4 SIRS criteria. Transaminitis and >1500 lipase. CT with enlarged and severely edematous pancreas with peripancreatic fat stranding and fluid, consistent with acute interstitial pancreatitis. No evidence of abscess or complications at this time. Etiology likely alcohol use. Does not take medications, no hx of abdominal surgery, no concern for gallstones.   - Start LR 200cc/hr  - Zofran 4mg IV prn q6h for nausea  - Pain control: IV Morphine 2mg for moderate pain and 4mg for severe pain q4h prn  - Ketorolac 15mg IV PRN q6h with breakthrough pain  - Clear liquid diet; advance as tolerated  - F/u triglyceride levels Fevers, chills, nausea, vomiting, and epigastric abdominal pain radiating to RLQ x1 day. Afebrile, mild leukocytosis and elevated lactate on admission. Meets 1/4 SIRS criteria. Transaminitis and >1500 lipase. CT with enlarged and severely edematous pancreas with peripancreatic fat stranding and fluid, consistent with acute interstitial pancreatitis. No evidence of abscess or complications at this time. Etiology likely alcohol use. Does not take medications, no hx of abdominal surgery, no concern for gallstones. Ca and triglycerides wnl.  - d/c maintenance fluids, encourage PO intake  - Zofran 4mg IV prn q6h for nausea  - Pain control: d/c morphine, Ketorolac 15mg IV PRN q6h with breakthrough pain  - advance to regular diet  - repeat CTAP given abdominal distention

## 2023-03-05 NOTE — DISCHARGE NOTE PROVIDER - HOSPITAL COURSE
38 y/o male with no significant PMHx who presents to [facility] with 1 day of progressively worsening abdominal pain and 5 episodes of vomiting with fevers, chills, and profuse sweating. Found to have enlarged and severely edematous pancreas with peripancreatic fat stranding and fluid. Admitted for acute interstitial pancreatitis.     #Acute pancreatitis.   Fevers, chills, nausea, vomiting, and epigastric abdominal pain radiating to RLQ x1 day. Afebrile, mild leukocytosis and elevated lactate on admission. Meets 1/4 SIRS criteria. Transaminitis and >1500 lipase. CT with enlarged and severely edematous pancreas with peripancreatic fat stranding and fluid, consistent with acute interstitial pancreatitis. No evidence of abscess or complications at this time. Etiology likely alcohol use. Does not take medications, no hx of abdominal surgery, no concern for gallstones.   - Start LR 200cc/hr  - Zofran 4mg IV prn q6h for nausea  - Pain control: IV Morphine 2mg for moderate pain and 4mg for severe pain q4h prn  - Ketorolac 15mg IV PRN q6h with breakthrough pain  - Clear liquid diet; advance as tolerated  - F/u triglyceride levels.    #Transaminitis.   , AST 88 on admission. ALT:AST ratio 2:1. Possibly reactive i/s/o pancreatitis. Could be hepatitis vs chronic alcohol use disorder, though ALT greater than AST.   - F/u repeat CMP  - F/u hep panel.    #Moderate alcohol use disorder.   Patient in precontemplative stage. CIWA 7 now, likely due to nausea/vomiting, otherwise no evidence of withdrawal. No hx of withdrawal or seizures. Last drink was 5pm last night, no blood alcohol level on admission.   - [facility] protocol  - SBIRT consult  - Thiamine, folate, multivitamin  - F/u Utox.    Patient was discharged to: (home/[facility]/acute rehab/hospice, etc, and with what services – home health PT/RN? Home O2?)    New medications:   Changes to old medications:  Medications that were stopped:  Items to follow up as outpatient:    Physical exam at the time of discharge:  ***   38 y/o male with no significant PMHx who presents to [facility] with 1 day of progressively worsening abdominal pain and 5 episodes of vomiting with fevers, chills, and profuse sweating. Found to have enlarged and severely edematous pancreas with peripancreatic fat stranding and fluid. Admitted for acute interstitial pancreatitis. Patient was discharged in stable medical condition, tolerating diet and pain resolved.    #Acute pancreatitis.   Fevers, chills, nausea, vomiting, and epigastric abdominal pain radiating to RLQ x1 day. Afebrile, mild leukocytosis and elevated lactate on admission. Meets 1/4 SIRS criteria. Transaminitis and >1500 lipase. CT with enlarged and severely edematous pancreas with peripancreatic fat stranding and fluid, consistent with acute interstitial pancreatitis. No evidence of abscess or complications at this time. Etiology likely alcohol use. Does not take medications, no hx of abdominal surgery, no concern for gallstones. Triglycerides and calcium wnl. Pt treated w/ pain control and maintenance fluids. Repeat CTAP obtained d/t abdominal distension showing possible evolving necrosis, however, pt symptomatically improved, tolerating PO diet with pain resolved. D/soledad in stable medical condition w/ [facility] PCP follow up.  -  on EtOH cessation  - f/u outpt    #Transaminitis.   , AST 88 on admission. ALT:AST ratio 2:1. Possibly reactive i/s/o pancreatitis. Could be hepatitis vs chronic alcohol use disorder, though ALT greater than AST. Hep panel negative.  - hep B vaccination outpt  -  on EtOH cessation  - f/u outpt    #Moderate alcohol use disorder.   Patient in precontemplative stage. CIWA 7 now, likely due to nausea/vomiting, otherwise no evidence of withdrawal. No hx of withdrawal or seizures. Last drink was 5pm last night, no blood alcohol level on admission. Drinks 5-6x/wk. Patient on CIWA protocol, did not require ativan. Treated with thiamine/folate/MTV.  -  on EtOH cessation  - f/u outpt    Patient was discharged to: home    New medications: none  Changes to old medications: none  Medications that were stopped: none  Items to follow up as outpatient: LFTs    Physical exam at the time of discharge:  General: Resting comfortably in bed; NAD  HEENT: NC/AT, EOMI, anicteric sclera, MMM, neck supple, no nasal discharge  Cardiac: RRR; normal S1/S2, no MRG, no LE edema  Respiratory: CTAB; no wheezes, ronchi, increased work of breathing, retractions  Gastrointestinal: +BSx4, abdomen distended, mild tenderness to palpation in epigastric region; no rebound or guarding  Extremities: WWP, no clubbing or cyanosis; no peripheral edema  Vascular: 2+ radial and DP pulses bilaterally  Dermatologic: skin warm, dry and intact; no rashes, open wounds  Neurologic: AAOx3; no focal deficits  Psychiatric: affect and characteristics of appearance, verbalizations, behaviors are appropriate

## 2023-03-05 NOTE — PROGRESS NOTE ADULT - ASSESSMENT
38 y/o male with no significant PMHx who presents to [facility] with 1 day of progressively worsening abdominal pain and 5 episodes of vomiting with fevers, chills, and profuse sweating. Found to have enlarged and severely edematous pancreas with peripancreatic fat stranding and fluid. Admitted for acute interstitial pancreatitis.  38 y/o male with no significant PMHx who presents to [facility] with 1 day of progressively worsening abdominal pain and 5 episodes of vomiting with fevers, chills, and profuse sweating. Found to have enlarged and severely edematous pancreas with peripancreatic fat stranding and fluid. Admitted for acute interstitial pancreatitis.

## 2023-03-05 NOTE — DISCHARGE NOTE PROVIDER - CARE PROVIDER_API CALL
Scott Curry)  Internal Medicine  [address] [place] [address]  Phone: [phone]  Fax: [phone]  Follow Up Time:

## 2023-03-05 NOTE — PROGRESS NOTE ADULT - PROBLEM SELECTOR PLAN 4
F: LR @200  E: monitor, replete prn (elevated Ca likely i/s/o dehydration)  N: Clear liquid diet, advance as tolerated  DVT ppx: SCDs (improve score 0)    Dispo: [facility] F: none, encourage PO  E: monitor, replete prn  N: full liquid diet, advance as tolerated  DVT ppx: SCDs (improve score 0)    Dispo: [facility] Mild, asymptomatic; possibly 2/2 vomiting  -c/w monitoring

## 2023-03-05 NOTE — DISCHARGE NOTE PROVIDER - NSDCCPCAREPLAN_GEN_ALL_CORE_FT
Discharged PRINCIPAL DISCHARGE DIAGNOSIS  Diagnosis: Pancreatitis  Assessment and Plan of Treatment: Pancreatitis is inflammation of the pancreas. The pancreas is a long, flat gland that sits tucked behind the stomach in the upper abdomen. The pancreas produces enzymes that help digestion and hormones that help regulate the way your body processes sugar (glucose).  Pancreatitis can occur as acute pancreatitis — meaning it appears suddenly and lasts for days. Some people develop chronic pancreatitis, which is pancreatitis that occurs over many years.  Mild cases of pancreatitis improve with treatment, but severe cases can cause life-threatening complications.       PRINCIPAL DISCHARGE DIAGNOSIS  Diagnosis: Pancreatitis  Assessment and Plan of Treatment: Pancreatitis is inflammation of the pancreas. The pancreas is a long, flat gland that sits tucked behind the stomach in the upper abdomen. The pancreas produces enzymes that help digestion and hormones that help regulate the way your body processes sugar (glucose).  Pancreatitis can occur as acute pancreatitis — meaning it appears suddenly and lasts for days. Some people develop chronic pancreatitis, which is pancreatitis that occurs over many years.  Mild cases of pancreatitis improve with treatment, but severe cases can cause life-threatening complications.  You presented with nausea, vomiting and abdominal pain and were found to have pancreatitis. You were treated with pain medication and hydration with improvement in your symptoms. A repeat CT of your abdomen showed questionable necrosis, which required additional monitoring.  The cause of your pancreatitis is likely related to your alcohol use. We recommended reducing your alcohol use as much as possible.  ************************************************  - Please reduce your alcohol intake.  - You may take a multivitamin and folate over the counter as supplements, in the setting of your alcohol use.  - Please follow up with your outpatient primary care provider. HENRIETTA       PRINCIPAL DISCHARGE DIAGNOSIS  Diagnosis: Pancreatitis  Assessment and Plan of Treatment: Pancreatitis is inflammation of the pancreas. The pancreas is a long, flat gland that sits tucked behind the stomach in the upper abdomen. The pancreas produces enzymes that help digestion and hormones that help regulate the way your body processes sugar (glucose).  Pancreatitis can occur as acute pancreatitis — meaning it appears suddenly and lasts for days. Some people develop chronic pancreatitis, which is pancreatitis that occurs over many years.  Mild cases of pancreatitis improve with treatment, but severe cases can cause life-threatening complications.  You presented with nausea, vomiting and abdominal pain and were found to have pancreatitis. You were treated with pain medication and hydration with improvement in your symptoms. A repeat CT of your abdomen showed questionable necrosis, which required additional monitoring.  The cause of your pancreatitis is likely related to your alcohol use. We recommended reducing your alcohol use as much as possible.  ************************************************  - Please reduce your alcohol intake.  - You may take a multivitamin and folate over the counter as supplements, in the setting of your alcohol use.  - Please follow up with your outpatient primary care provider. Our schedulers will reach out to you to schedule an appointment.

## 2023-03-05 NOTE — PROGRESS NOTE ADULT - SUBJECTIVE AND OBJECTIVE BOX
***INCOMPLETE NOTE    SUBJECTIVE / INTERVAL HPI: Patient seen and examined at bedside. No overnight events.    VITAL SIGNS:  Vital Signs Last 24 Hrs  T(C): 37.1 (05 Mar 2023 08:49), Max: 37.6 (04 Mar 2023 20:30)  T(F): 98.7 (05 Mar 2023 08:49), Max: 99.7 (05 Mar 2023 05:00)  HR: 85 (05 Mar 2023 08:49) (81 - 89)  BP: 142/76 (05 Mar 2023 08:49) (136/77 - 157/98)  BP(mean): 96 (05 Mar 2023 05:00) (96 - 96)  RR: 17 (05 Mar 2023 08:49) (17 - 18)  SpO2: 95% (05 Mar 2023 08:49) (95% - 97%)    Parameters below as of 05 Mar 2023 08:49  Patient On (Oxygen Delivery Method): room air        PHYSICAL EXAM:    General: Resting comfortably in bed; NAD  HEENT: NC/AT, EOMI, anicteric sclera, MMM, neck supple, no nasal discharge  Cardiac: RRR; normal S1/S2, no MRG, no LE edema, no JVD  Respiratory: CTAB; no wheezes, ronchi, increased work of breathing, retractions  Gastrointestinal: +BSx4, abdomen soft, NT/ND; no rebound or guarding  Genitourinary: no suprapubic tenderness; garcia*; normal external genitalia  Extremities: WWP, no clubbing or cyanosis; no peripheral edema  Vascular: 2+ radial and DP pulses bilaterally  Dermatologic: skin warm, dry and intact; no rashes, open wounds  Neurologic: AAOx3; no focal deficits  Psychiatric: affect and characteristics of appearance, verbalizations, behaviors are appropriate    MEDICATIONS:  MEDICATIONS  (STANDING):  folic acid 1 milliGRAM(s) Oral daily  influenza   Vaccine 0.5 milliLiter(s) IntraMuscular once  lactated ringers. 1000 milliLiter(s) (200 mL/Hr) IV Continuous <Continuous>  multivitamin 1 Tablet(s) Oral daily  thiamine 100 milliGRAM(s) Oral daily    MEDICATIONS  (PRN):  acetaminophen     Tablet .. 650 milliGRAM(s) Oral every 6 hours PRN Temp greater or equal to 38C (100.4F), Mild Pain (1 - 3)  ketorolac   Injectable 15 milliGRAM(s) IV Push every 6 hours PRN with breakthrough pain  LORazepam   Injectable 2 milliGRAM(s) IV Push every 2 hours PRN CIWA-Ar score 8 or greater  melatonin 3 milliGRAM(s) Oral at bedtime PRN Insomnia  morphine  - Injectable 2 milliGRAM(s) IV Push every 6 hours PRN Moderate Pain (4 - 6)  morphine  - Injectable 4 milliGRAM(s) IV Push every 6 hours PRN Severe Pain (7 - 10)  ondansetron Injectable 4 milliGRAM(s) IV Push every 6 hours PRN Nausea and/or Vomiting      ALLERGIES:  Allergies    No Known Allergies    Intolerances        LABS:                        14.1   13.60 )-----------( 196      ( 05 Mar 2023 07:43 )             43.3     03-05    132<L>  |  96  |  13  ----------------------------<  92  4.1   |  27  |  0.94    Ca    8.1<L>      05 Mar 2023 07:43  Phos  1.7     03-05  Mg     2.2     03-05    TPro  5.5<L>  /  Alb  3.1<L>  /  TBili  0.9  /  DBili  x   /  AST  45<H>  /  ALT  59<H>  /  AlkPhos  36<L>  03-05        CAPILLARY BLOOD GLUCOSE          RADIOLOGY & ADDITIONAL TESTS: Reviewed.   SUBJECTIVE / INTERVAL HPI: Patient seen and examined at bedside. No overnight events. Patient feeling much improved this AM. Epigastric pain resolving, however, patient feels very bloated and distended this morning. Patient also concerned that he has not been urinating enough given the amount of fluid he has been taking in. Otherwise feels that he can eat something today. Denies fevers, night sweats, CP, SOB, dysuria, pain in extremities.    VITAL SIGNS:  Vital Signs Last 24 Hrs  T(C): 37.1 (05 Mar 2023 08:49), Max: 37.6 (04 Mar 2023 20:30)  T(F): 98.7 (05 Mar 2023 08:49), Max: 99.7 (05 Mar 2023 05:00)  HR: 85 (05 Mar 2023 08:49) (81 - 89)  BP: 142/76 (05 Mar 2023 08:49) (136/77 - 157/98)  BP(mean): 96 (05 Mar 2023 05:00) (96 - 96)  RR: 17 (05 Mar 2023 08:49) (17 - 18)  SpO2: 95% (05 Mar 2023 08:49) (95% - 97%)    Parameters below as of 05 Mar 2023 08:49  Patient On (Oxygen Delivery Method): room air        PHYSICAL EXAM:    General: Resting comfortably in bed; NAD  HEENT: NC/AT, EOMI, anicteric sclera, MMM, neck supple, no nasal discharge  Cardiac: RRR; normal S1/S2, no MRG, no LE edema  Respiratory: CTAB; no wheezes, ronchi, increased work of breathing, retractions  Gastrointestinal: +BSx4, abdomen distended, mild tenderness to palpation in epigastric region; no rebound or guarding  Extremities: WWP, no clubbing or cyanosis; no peripheral edema  Vascular: 2+ radial and DP pulses bilaterally  Dermatologic: skin warm, dry and intact; no rashes, open wounds  Neurologic: AAOx3; no focal deficits  Psychiatric: affect and characteristics of appearance, verbalizations, behaviors are appropriate    MEDICATIONS:  MEDICATIONS  (STANDING):  folic acid 1 milliGRAM(s) Oral daily  influenza   Vaccine 0.5 milliLiter(s) IntraMuscular once  lactated ringers. 1000 milliLiter(s) (200 mL/Hr) IV Continuous <Continuous>  multivitamin 1 Tablet(s) Oral daily  thiamine 100 milliGRAM(s) Oral daily    MEDICATIONS  (PRN):  acetaminophen     Tablet .. 650 milliGRAM(s) Oral every 6 hours PRN Temp greater or equal to 38C (100.4F), Mild Pain (1 - 3)  ketorolac   Injectable 15 milliGRAM(s) IV Push every 6 hours PRN with breakthrough pain  LORazepam   Injectable 2 milliGRAM(s) IV Push every 2 hours PRN CIWA-Ar score 8 or greater  melatonin 3 milliGRAM(s) Oral at bedtime PRN Insomnia  morphine  - Injectable 2 milliGRAM(s) IV Push every 6 hours PRN Moderate Pain (4 - 6)  morphine  - Injectable 4 milliGRAM(s) IV Push every 6 hours PRN Severe Pain (7 - 10)  ondansetron Injectable 4 milliGRAM(s) IV Push every 6 hours PRN Nausea and/or Vomiting      ALLERGIES:  Allergies    No Known Allergies    Intolerances        LABS:                        14.1   13.60 )-----------( 196      ( 05 Mar 2023 07:43 )             43.3     03-05    132<L>  |  96  |  13  ----------------------------<  92  4.1   |  27  |  0.94    Ca    8.1<L>      05 Mar 2023 07:43  Phos  1.7     03-05  Mg     2.2     03-05    TPro  5.5<L>  /  Alb  3.1<L>  /  TBili  0.9  /  DBili  x   /  AST  45<H>  /  ALT  59<H>  /  AlkPhos  36<L>  03-05        CAPILLARY BLOOD GLUCOSE          RADIOLOGY & ADDITIONAL TESTS: Reviewed.

## 2023-03-05 NOTE — PROGRESS NOTE ADULT - PROBLEM SELECTOR PLAN 2
, AST 88 on admission. ALT:AST ratio 2:1. Possibly reactive i/s/o pancreatitis. Could be hepatitis vs chronic alcohol use disorder, though ALT greater than AST.   - F/u repeat CMP  - F/u hep panel , AST 88 on admission. ALT:AST ratio 2:1. Possibly reactive i/s/o pancreatitis. Could be hepatitis vs chronic alcohol use disorder, though ALT greater than AST. Downtrending, however LFTs still elevated. Hep panel negative.  - f/u outpt  - pt will need hep B vaccine

## 2023-03-06 ENCOUNTER — TRANSCRIPTION ENCOUNTER (OUTPATIENT)
Age: 38
End: 2023-03-06

## 2023-03-06 VITALS
TEMPERATURE: 99 F | DIASTOLIC BLOOD PRESSURE: 77 MMHG | HEART RATE: 80 BPM | OXYGEN SATURATION: 95 % | SYSTOLIC BLOOD PRESSURE: 131 MMHG | RESPIRATION RATE: 18 BRPM

## 2023-03-06 DIAGNOSIS — E87.1 HYPO-OSMOLALITY AND HYPONATREMIA: ICD-10-CM

## 2023-03-06 DIAGNOSIS — E87.20 ACIDOSIS, UNSPECIFIED: ICD-10-CM

## 2023-03-06 DIAGNOSIS — E83.39 OTHER DISORDERS OF PHOSPHORUS METABOLISM: ICD-10-CM

## 2023-03-06 DIAGNOSIS — E83.42 HYPOMAGNESEMIA: ICD-10-CM

## 2023-03-06 PROBLEM — Z78.9 OTHER SPECIFIED HEALTH STATUS: Chronic | Status: ACTIVE | Noted: 2023-03-04

## 2023-03-06 LAB
ALBUMIN SERPL ELPH-MCNC: 2.9 G/DL — LOW (ref 3.3–5)
ALP SERPL-CCNC: 43 U/L — SIGNIFICANT CHANGE UP (ref 40–120)
ALT FLD-CCNC: 43 U/L — SIGNIFICANT CHANGE UP (ref 10–45)
ANION GAP SERPL CALC-SCNC: 10 MMOL/L — SIGNIFICANT CHANGE UP (ref 5–17)
AST SERPL-CCNC: 32 U/L — SIGNIFICANT CHANGE UP (ref 10–40)
BASOPHILS # BLD AUTO: 0.02 K/UL — SIGNIFICANT CHANGE UP (ref 0–0.2)
BASOPHILS NFR BLD AUTO: 0.2 % — SIGNIFICANT CHANGE UP (ref 0–2)
BILIRUB SERPL-MCNC: 1.1 MG/DL — SIGNIFICANT CHANGE UP (ref 0.2–1.2)
BUN SERPL-MCNC: 8 MG/DL — SIGNIFICANT CHANGE UP (ref 7–23)
CALCIUM SERPL-MCNC: 7.8 MG/DL — LOW (ref 8.4–10.5)
CHLORIDE SERPL-SCNC: 96 MMOL/L — SIGNIFICANT CHANGE UP (ref 96–108)
CO2 SERPL-SCNC: 26 MMOL/L — SIGNIFICANT CHANGE UP (ref 22–31)
CREAT SERPL-MCNC: 0.79 MG/DL — SIGNIFICANT CHANGE UP (ref 0.5–1.3)
EGFR: 117 ML/MIN/1.73M2 — SIGNIFICANT CHANGE UP
EOSINOPHIL # BLD AUTO: 0.03 K/UL — SIGNIFICANT CHANGE UP (ref 0–0.5)
EOSINOPHIL NFR BLD AUTO: 0.3 % — SIGNIFICANT CHANGE UP (ref 0–6)
GLUCOSE SERPL-MCNC: 90 MG/DL — SIGNIFICANT CHANGE UP (ref 70–99)
HCT VFR BLD CALC: 40.8 % — SIGNIFICANT CHANGE UP (ref 39–50)
HGB BLD-MCNC: 13.2 G/DL — SIGNIFICANT CHANGE UP (ref 13–17)
IMM GRANULOCYTES NFR BLD AUTO: 0.8 % — SIGNIFICANT CHANGE UP (ref 0–0.9)
LYMPHOCYTES # BLD AUTO: 1.22 K/UL — SIGNIFICANT CHANGE UP (ref 1–3.3)
LYMPHOCYTES # BLD AUTO: 10.7 % — LOW (ref 13–44)
MAGNESIUM SERPL-MCNC: 2.2 MG/DL — SIGNIFICANT CHANGE UP (ref 1.6–2.6)
MCHC RBC-ENTMCNC: 30.3 PG — SIGNIFICANT CHANGE UP (ref 27–34)
MCHC RBC-ENTMCNC: 32.4 GM/DL — SIGNIFICANT CHANGE UP (ref 32–36)
MCV RBC AUTO: 93.8 FL — SIGNIFICANT CHANGE UP (ref 80–100)
MONOCYTES # BLD AUTO: 0.87 K/UL — SIGNIFICANT CHANGE UP (ref 0–0.9)
MONOCYTES NFR BLD AUTO: 7.7 % — SIGNIFICANT CHANGE UP (ref 2–14)
NEUTROPHILS # BLD AUTO: 9.14 K/UL — HIGH (ref 1.8–7.4)
NEUTROPHILS NFR BLD AUTO: 80.3 % — HIGH (ref 43–77)
NRBC # BLD: 0 /100 WBCS — SIGNIFICANT CHANGE UP (ref 0–0)
PHOSPHATE SERPL-MCNC: 1.3 MG/DL — LOW (ref 2.5–4.5)
PLATELET # BLD AUTO: 169 K/UL — SIGNIFICANT CHANGE UP (ref 150–400)
POTASSIUM SERPL-MCNC: 3.9 MMOL/L — SIGNIFICANT CHANGE UP (ref 3.5–5.3)
POTASSIUM SERPL-SCNC: 3.9 MMOL/L — SIGNIFICANT CHANGE UP (ref 3.5–5.3)
PROT SERPL-MCNC: 5.8 G/DL — LOW (ref 6–8.3)
RBC # BLD: 4.35 M/UL — SIGNIFICANT CHANGE UP (ref 4.2–5.8)
RBC # FLD: 14.3 % — SIGNIFICANT CHANGE UP (ref 10.3–14.5)
SODIUM SERPL-SCNC: 132 MMOL/L — LOW (ref 135–145)
WBC # BLD: 11.37 K/UL — HIGH (ref 3.8–10.5)
WBC # FLD AUTO: 11.37 K/UL — HIGH (ref 3.8–10.5)

## 2023-03-06 PROCEDURE — 85025 COMPLETE CBC W/AUTO DIFF WBC: CPT

## 2023-03-06 PROCEDURE — 84300 ASSAY OF URINE SODIUM: CPT

## 2023-03-06 PROCEDURE — 80048 BASIC METABOLIC PNL TOTAL CA: CPT

## 2023-03-06 PROCEDURE — 93005 ELECTROCARDIOGRAM TRACING: CPT

## 2023-03-06 PROCEDURE — 83605 ASSAY OF LACTIC ACID: CPT

## 2023-03-06 PROCEDURE — 80053 COMPREHEN METABOLIC PANEL: CPT

## 2023-03-06 PROCEDURE — 87635 SARS-COV-2 COVID-19 AMP PRB: CPT

## 2023-03-06 PROCEDURE — 96375 TX/PRO/DX INJ NEW DRUG ADDON: CPT

## 2023-03-06 PROCEDURE — 82570 ASSAY OF URINE CREATININE: CPT

## 2023-03-06 PROCEDURE — 36415 COLL VENOUS BLD VENIPUNCTURE: CPT

## 2023-03-06 PROCEDURE — 84100 ASSAY OF PHOSPHORUS: CPT

## 2023-03-06 PROCEDURE — 83935 ASSAY OF URINE OSMOLALITY: CPT

## 2023-03-06 PROCEDURE — 83690 ASSAY OF LIPASE: CPT

## 2023-03-06 PROCEDURE — 99285 EMERGENCY DEPT VISIT HI MDM: CPT

## 2023-03-06 PROCEDURE — 96374 THER/PROPH/DIAG INJ IV PUSH: CPT

## 2023-03-06 PROCEDURE — 99239 HOSP IP/OBS DSCHRG MGMT >30: CPT | Mod: GC

## 2023-03-06 PROCEDURE — 83735 ASSAY OF MAGNESIUM: CPT

## 2023-03-06 PROCEDURE — 74177 CT ABD & PELVIS W/CONTRAST: CPT

## 2023-03-06 PROCEDURE — 80307 DRUG TEST PRSMV CHEM ANLYZR: CPT

## 2023-03-06 PROCEDURE — 96376 TX/PRO/DX INJ SAME DRUG ADON: CPT

## 2023-03-06 PROCEDURE — 80074 ACUTE HEPATITIS PANEL: CPT

## 2023-03-06 PROCEDURE — 85027 COMPLETE CBC AUTOMATED: CPT

## 2023-03-06 PROCEDURE — 84478 ASSAY OF TRIGLYCERIDES: CPT

## 2023-03-06 PROCEDURE — 80076 HEPATIC FUNCTION PANEL: CPT

## 2023-03-06 RX ADMIN — Medication 85 MILLIMOLE(S): at 11:30

## 2023-03-06 NOTE — PROGRESS NOTE ADULT - PROBLEM SELECTOR PLAN 1
likely due to EtOH use by hx; CT A/P 3/3 reviewed, shows "interstitial edematous pancreatitis;" interval CT showed "question evolving necrosis of the pancreatic neck;" however, pancreatitis has clinically resolved, Pt. tolerating PO and no longer requires PRN analgesics; cont. low-fat diet as tolerated

## 2023-03-06 NOTE — DISCHARGE NOTE NURSING/CASE MANAGEMENT/SOCIAL WORK - PATIENT PORTAL LINK FT
You can access the FollowMyHealth Patient Portal offered by [facility] by registering at the following website: [email or web address]. By joining [facility]’s FollowMyHealth portal, you will also be able to view your health information using other applications (apps) compatible with our system.

## 2023-03-06 NOTE — PROGRESS NOTE ADULT - SUBJECTIVE AND OBJECTIVE BOX
Patient is a 37y old  Male who presents with a chief complaint of     INTERVAL HPI/OVERNIGHT EVENTS:    Pt. seen and examined earlier today  Pt. feels much better, reports his abdominal pain has resolved  Pt. denies F/C, CP, SOB, N/V  Tolerating PO    Review of Systems: 12 point review of systems otherwise negative    MEDICATIONS  (STANDING):  folic acid 1 milliGRAM(s) Oral daily  influenza   Vaccine 0.5 milliLiter(s) IntraMuscular once  melatonin 5 milliGRAM(s) Oral at bedtime  multivitamin 1 Tablet(s) Oral daily  polyethylene glycol 3350 17 Gram(s) Oral daily  senna 1 Tablet(s) Oral daily  simethicone 80 milliGRAM(s) Chew every 24 hours  thiamine 100 milliGRAM(s) Oral daily    MEDICATIONS  (PRN):  acetaminophen     Tablet .. 650 milliGRAM(s) Oral every 6 hours PRN Temp greater or equal to 38C (100.4F), Mild Pain (1 - 3)  ketorolac   Injectable 15 milliGRAM(s) IV Push every 6 hours PRN with breakthrough pain  LORazepam   Injectable 2 milliGRAM(s) IV Push every 2 hours PRN CIWA-Ar score 8 or greater  ondansetron Injectable 4 milliGRAM(s) IV Push every 6 hours PRN Nausea and/or Vomiting      Allergies    No Known Allergies    Intolerances          Vital Signs Last 24 Hrs  T(C): 37.4 (06 Mar 2023 05:29), Max: 37.4 (06 Mar 2023 05:29)  T(F): 99.4 (06 Mar 2023 05:29), Max: 99.4 (06 Mar 2023 05:29)  HR: 80 (06 Mar 2023 05:29) (80 - 97)  BP: 131/77 (06 Mar 2023 05:29) (131/77 - 148/83)  BP(mean): --  RR: 18 (06 Mar 2023 05:29) (17 - 18)  SpO2: 95% (06 Mar 2023 05:29) (95% - 95%)    Parameters below as of 06 Mar 2023 05:29  Patient On (Oxygen Delivery Method): room air      CAPILLARY BLOOD GLUCOSE          03-05 @ 07:01  -  03-06 @ 07:00  --------------------------------------------------------  IN: 380 mL / OUT: 2775 mL / NET: -2395 mL    03-06 @ 07:01  -  03-06 @ 15:11  --------------------------------------------------------  IN: 280 mL / OUT: 0 mL / NET: 280 mL        Physical Exam:  (earlier today)  Daily     Daily   General:  comfortable-appearing in NAD  HEENT:  MMM, anicteric  CV:  RRR  Lungs:  CTA B/L  Abdomen:  soft NT ND  Extremities:  no edema B/L LE  Skin:  WWP  Neuro:  AAOx3    LABS:                        13.2   11.37 )-----------( 169      ( 06 Mar 2023 05:30 )             40.8     03-06    132<L>  |  96  |  8   ----------------------------<  90  3.9   |  26  |  0.79    Ca    7.8<L>      06 Mar 2023 05:30  Phos  1.3     03-06  Mg     2.2     03-06    TPro  5.8<L>  /  Alb  2.9<L>  /  TBili  1.1  /  DBili  x   /  AST  32  /  ALT  43  /  AlkPhos  43  03-06

## 2023-03-06 NOTE — DISCHARGE NOTE NURSING/CASE MANAGEMENT/SOCIAL WORK - NSDCPEFALRISK_GEN_ALL_CORE
For information on Fall & Injury Prevention, visit: [email or web address] OR  [email or web address] OR  https://www.cdc.gov/steadi/patient.html

## 2023-03-06 NOTE — PROGRESS NOTE ADULT - PROBLEM SELECTOR PLAN 5
likely due to EtOH use; liver appears normal on imaging; viral hepatitis panel negative; #s normalized
F: none, encourage PO  E: monitor, replete prn  N: full liquid diet, advance as tolerated  DVT ppx: SCDs (improve score 0)    Dispo: [facility]

## 2023-03-09 DIAGNOSIS — E87.20 ACIDOSIS, UNSPECIFIED: ICD-10-CM

## 2023-03-09 DIAGNOSIS — R00.1 BRADYCARDIA, UNSPECIFIED: ICD-10-CM

## 2023-03-09 DIAGNOSIS — E87.1 HYPO-OSMOLALITY AND HYPONATREMIA: ICD-10-CM

## 2023-03-09 DIAGNOSIS — E83.42 HYPOMAGNESEMIA: ICD-10-CM

## 2023-03-09 DIAGNOSIS — K85.80 OTHER ACUTE PANCREATITIS WITHOUT NECROSIS OR INFECTION: ICD-10-CM

## 2023-03-09 DIAGNOSIS — F10.10 ALCOHOL ABUSE, UNCOMPLICATED: ICD-10-CM

## 2023-03-09 DIAGNOSIS — R74.8 ABNORMAL LEVELS OF OTHER SERUM ENZYMES: ICD-10-CM

## 2023-03-09 DIAGNOSIS — E83.39 OTHER DISORDERS OF PHOSPHORUS METABOLISM: ICD-10-CM

## 2023-03-09 DIAGNOSIS — Z20.822 CONTACT WITH AND (SUSPECTED) EXPOSURE TO COVID-19: ICD-10-CM

## 2023-03-10 ENCOUNTER — NON-APPOINTMENT (OUTPATIENT)
Age: 38
End: 2023-03-10

## 2023-03-16 ENCOUNTER — APPOINTMENT (OUTPATIENT)
Age: 38
End: 2023-03-16
Payer: COMMERCIAL

## 2023-03-16 VITALS
HEIGHT: 72 IN | DIASTOLIC BLOOD PRESSURE: 77 MMHG | SYSTOLIC BLOOD PRESSURE: 121 MMHG | TEMPERATURE: 97 F | HEART RATE: 60 BPM | BODY MASS INDEX: 24.38 KG/M2 | OXYGEN SATURATION: 100 % | WEIGHT: 180 LBS

## 2023-03-16 DIAGNOSIS — Z80.0 FAMILY HISTORY OF MALIGNANT NEOPLASM OF DIGESTIVE ORGANS: ICD-10-CM

## 2023-03-16 DIAGNOSIS — K85.20 ALCOHOL INDUCED ACUTE PANCREATITIS WITHOUT NECROSIS OR INFECTION: ICD-10-CM

## 2023-03-16 DIAGNOSIS — Z82.0 FAMILY HISTORY OF EPILEPSY AND OTHER DISEASES OF THE NERVOUS SYSTEM: ICD-10-CM

## 2023-03-16 DIAGNOSIS — Z87.891 PERSONAL HISTORY OF NICOTINE DEPENDENCE: ICD-10-CM

## 2023-03-16 DIAGNOSIS — K85.90 ACUTE PANCREATITIS WITHOUT NECROSIS OR INFECTION, UNSPECIFIED: ICD-10-CM

## 2023-03-16 PROCEDURE — 99214 OFFICE O/P EST MOD 30 MIN: CPT | Mod: 25,GC

## 2023-03-16 PROCEDURE — 36415 COLL VENOUS BLD VENIPUNCTURE: CPT

## 2023-03-16 PROCEDURE — 99204 OFFICE O/P NEW MOD 45 MIN: CPT | Mod: 25,GC

## 2023-03-17 LAB
ALBUMIN SERPL ELPH-MCNC: 4.1 G/DL
ALP BLD-CCNC: 41 U/L
ALT SERPL-CCNC: 95 U/L
ANION GAP SERPL CALC-SCNC: 12 MMOL/L
AST SERPL-CCNC: 57 U/L
BILIRUB SERPL-MCNC: 0.2 MG/DL
BUN SERPL-MCNC: 11 MG/DL
CALCIUM SERPL-MCNC: 9.2 MG/DL
CHLORIDE SERPL-SCNC: 101 MMOL/L
CO2 SERPL-SCNC: 25 MMOL/L
CREAT SERPL-MCNC: 0.85 MG/DL
EGFR: 115 ML/MIN/1.73M2
GLUCOSE SERPL-MCNC: 110 MG/DL
HBV SURFACE AB SER QL: REACTIVE
PHOSPHATE SERPL-MCNC: 3.2 MG/DL
POTASSIUM SERPL-SCNC: 4.4 MMOL/L
PROT SERPL-MCNC: 6.4 G/DL
SODIUM SERPL-SCNC: 138 MMOL/L